# Patient Record
Sex: FEMALE | Race: WHITE
[De-identification: names, ages, dates, MRNs, and addresses within clinical notes are randomized per-mention and may not be internally consistent; named-entity substitution may affect disease eponyms.]

---

## 2019-01-22 ENCOUNTER — HOSPITAL ENCOUNTER (EMERGENCY)
Dept: HOSPITAL 47 - EC | Age: 39
LOS: 1 days | Discharge: HOME | End: 2019-01-23
Payer: COMMERCIAL

## 2019-01-22 DIAGNOSIS — Z98.51: ICD-10-CM

## 2019-01-22 DIAGNOSIS — N12: Primary | ICD-10-CM

## 2019-01-22 DIAGNOSIS — Z87.42: ICD-10-CM

## 2019-01-22 DIAGNOSIS — F17.200: ICD-10-CM

## 2019-01-22 DIAGNOSIS — Z90.49: ICD-10-CM

## 2019-01-22 LAB
ALBUMIN SERPL-MCNC: 4 G/DL (ref 3.5–5)
ALP SERPL-CCNC: 81 U/L (ref 38–126)
ALT SERPL-CCNC: 27 U/L (ref 9–52)
ANION GAP SERPL CALC-SCNC: 8 MMOL/L
AST SERPL-CCNC: 15 U/L (ref 14–36)
BASOPHILS # BLD AUTO: 0.1 K/UL (ref 0–0.2)
BASOPHILS NFR BLD AUTO: 0 %
BUN SERPL-SCNC: 11 MG/DL (ref 7–17)
CALCIUM SPEC-MCNC: 9.2 MG/DL (ref 8.4–10.2)
CHLORIDE SERPL-SCNC: 108 MMOL/L (ref 98–107)
CO2 SERPL-SCNC: 24 MMOL/L (ref 22–30)
EOSINOPHIL # BLD AUTO: 0.3 K/UL (ref 0–0.7)
EOSINOPHIL NFR BLD AUTO: 2 %
ERYTHROCYTE [DISTWIDTH] IN BLOOD BY AUTOMATED COUNT: 4.96 M/UL (ref 3.8–5.4)
ERYTHROCYTE [DISTWIDTH] IN BLOOD: 12.9 % (ref 11.5–15.5)
GLUCOSE SERPL-MCNC: 86 MG/DL (ref 74–99)
HCT VFR BLD AUTO: 44.9 % (ref 34–46)
HGB BLD-MCNC: 15.1 GM/DL (ref 11.4–16)
LYMPHOCYTES # SPEC AUTO: 3.8 K/UL (ref 1–4.8)
LYMPHOCYTES NFR SPEC AUTO: 30 %
MCH RBC QN AUTO: 30.4 PG (ref 25–35)
MCHC RBC AUTO-ENTMCNC: 33.5 G/DL (ref 31–37)
MCV RBC AUTO: 90.6 FL (ref 80–100)
MONOCYTES # BLD AUTO: 0.7 K/UL (ref 0–1)
MONOCYTES NFR BLD AUTO: 5 %
NEUTROPHILS # BLD AUTO: 7.8 K/UL (ref 1.3–7.7)
NEUTROPHILS NFR BLD AUTO: 61 %
PH UR: 5.5 [PH] (ref 5–8)
PLATELET # BLD AUTO: 252 K/UL (ref 150–450)
POTASSIUM SERPL-SCNC: 4 MMOL/L (ref 3.5–5.1)
PROT SERPL-MCNC: 6.8 G/DL (ref 6.3–8.2)
RBC UR QL: 1 /HPF (ref 0–5)
SODIUM SERPL-SCNC: 140 MMOL/L (ref 137–145)
SP GR UR: 1.02 (ref 1–1.03)
SQUAMOUS UR QL AUTO: 18 /HPF (ref 0–4)
UROBILINOGEN UR QL STRIP: 2 MG/DL (ref ?–2)
WBC # BLD AUTO: 12.8 K/UL (ref 3.8–10.6)

## 2019-01-22 PROCEDURE — 81025 URINE PREGNANCY TEST: CPT

## 2019-01-22 PROCEDURE — 36415 COLL VENOUS BLD VENIPUNCTURE: CPT

## 2019-01-22 PROCEDURE — 93975 VASCULAR STUDY: CPT

## 2019-01-22 PROCEDURE — 87086 URINE CULTURE/COLONY COUNT: CPT

## 2019-01-22 PROCEDURE — 76830 TRANSVAGINAL US NON-OB: CPT

## 2019-01-22 PROCEDURE — 99284 EMERGENCY DEPT VISIT MOD MDM: CPT

## 2019-01-22 PROCEDURE — 80053 COMPREHEN METABOLIC PANEL: CPT

## 2019-01-22 PROCEDURE — 85025 COMPLETE CBC W/AUTO DIFF WBC: CPT

## 2019-01-22 PROCEDURE — 96366 THER/PROPH/DIAG IV INF ADDON: CPT

## 2019-01-22 PROCEDURE — 96361 HYDRATE IV INFUSION ADD-ON: CPT

## 2019-01-22 PROCEDURE — 81001 URINALYSIS AUTO W/SCOPE: CPT

## 2019-01-22 PROCEDURE — 96365 THER/PROPH/DIAG IV INF INIT: CPT

## 2019-01-22 NOTE — ED
Medical Decision Making





- Medical Decision Making


abx e-scribed per pt request 








- Lab Data


Result diagrams: 


 01/22/19 21:30





 01/22/19 21:30





 Lab Results











  01/22/19 01/22/19 01/22/19 Range/Units





  21:22 21:22 21:30 


 


WBC    12.8 H  (3.8-10.6)  k/uL


 


RBC    4.96  (3.80-5.40)  m/uL


 


Hgb    15.1  (11.4-16.0)  gm/dL


 


Hct    44.9  (34.0-46.0)  %


 


MCV    90.6  (80.0-100.0)  fL


 


MCH    30.4  (25.0-35.0)  pg


 


MCHC    33.5  (31.0-37.0)  g/dL


 


RDW    12.9  (11.5-15.5)  %


 


Plt Count    252  (150-450)  k/uL


 


Neutrophils %    61  %


 


Lymphocytes %    30  %


 


Monocytes %    5  %


 


Eosinophils %    2  %


 


Basophils %    0  %


 


Neutrophils #    7.8 H  (1.3-7.7)  k/uL


 


Lymphocytes #    3.8  (1.0-4.8)  k/uL


 


Monocytes #    0.7  (0-1.0)  k/uL


 


Eosinophils #    0.3  (0-0.7)  k/uL


 


Basophils #    0.1  (0-0.2)  k/uL


 


Sodium     (137-145)  mmol/L


 


Potassium     (3.5-5.1)  mmol/L


 


Chloride     ()  mmol/L


 


Carbon Dioxide     (22-30)  mmol/L


 


Anion Gap     mmol/L


 


BUN     (7-17)  mg/dL


 


Creatinine     (0.52-1.04)  mg/dL


 


Est GFR (CKD-EPI)AfAm     (>60 ml/min/1.73 sqM)  


 


Est GFR (CKD-EPI)NonAf     (>60 ml/min/1.73 sqM)  


 


Glucose     (74-99)  mg/dL


 


Calcium     (8.4-10.2)  mg/dL


 


Total Bilirubin     (0.2-1.3)  mg/dL


 


AST     (14-36)  U/L


 


ALT     (9-52)  U/L


 


Alkaline Phosphatase     ()  U/L


 


Total Protein     (6.3-8.2)  g/dL


 


Albumin     (3.5-5.0)  g/dL


 


Urine Color   Yellow   


 


Urine Appearance   Cloudy H   (Clear)  


 


Urine pH   5.5   (5.0-8.0)  


 


Ur Specific Gravity   1.023   (1.001-1.035)  


 


Urine Protein   Trace H   (Negative)  


 


Urine Glucose (UA)   Negative   (Negative)  


 


Urine Ketones   Negative   (Negative)  


 


Urine Blood   Moderate H   (Negative)  


 


Urine Nitrite   Negative   (Negative)  


 


Urine Bilirubin   Negative   (Negative)  


 


Urine Urobilinogen   2.0   (<2.0)  mg/dL


 


Ur Leukocyte Esterase   Large H   (Negative)  


 


Urine RBC   1   (0-5)  /hpf


 


Urine WBC   26 H   (0-5)  /hpf


 


Ur Squamous Epith Cells   18 H   (0-4)  /hpf


 


Urine Mucus   Many H   (None)  /hpf


 


Urine HCG, Qual  Not Detected    (Not Detectd)  














  01/22/19 Range/Units





  21:30 


 


WBC   (3.8-10.6)  k/uL


 


RBC   (3.80-5.40)  m/uL


 


Hgb   (11.4-16.0)  gm/dL


 


Hct   (34.0-46.0)  %


 


MCV   (80.0-100.0)  fL


 


MCH   (25.0-35.0)  pg


 


MCHC   (31.0-37.0)  g/dL


 


RDW   (11.5-15.5)  %


 


Plt Count   (150-450)  k/uL


 


Neutrophils %   %


 


Lymphocytes %   %


 


Monocytes %   %


 


Eosinophils %   %


 


Basophils %   %


 


Neutrophils #   (1.3-7.7)  k/uL


 


Lymphocytes #   (1.0-4.8)  k/uL


 


Monocytes #   (0-1.0)  k/uL


 


Eosinophils #   (0-0.7)  k/uL


 


Basophils #   (0-0.2)  k/uL


 


Sodium  140  (137-145)  mmol/L


 


Potassium  4.0  (3.5-5.1)  mmol/L


 


Chloride  108 H  ()  mmol/L


 


Carbon Dioxide  24  (22-30)  mmol/L


 


Anion Gap  8  mmol/L


 


BUN  11  (7-17)  mg/dL


 


Creatinine  0.53  (0.52-1.04)  mg/dL


 


Est GFR (CKD-EPI)AfAm  >90  (>60 ml/min/1.73 sqM)  


 


Est GFR (CKD-EPI)NonAf  >90  (>60 ml/min/1.73 sqM)  


 


Glucose  86  (74-99)  mg/dL


 


Calcium  9.2  (8.4-10.2)  mg/dL


 


Total Bilirubin  0.4  (0.2-1.3)  mg/dL


 


AST  15  (14-36)  U/L


 


ALT  27  (9-52)  U/L


 


Alkaline Phosphatase  81  ()  U/L


 


Total Protein  6.8  (6.3-8.2)  g/dL


 


Albumin  4.0  (3.5-5.0)  g/dL


 


Urine Color   


 


Urine Appearance   (Clear)  


 


Urine pH   (5.0-8.0)  


 


Ur Specific Gravity   (1.001-1.035)  


 


Urine Protein   (Negative)  


 


Urine Glucose (UA)   (Negative)  


 


Urine Ketones   (Negative)  


 


Urine Blood   (Negative)  


 


Urine Nitrite   (Negative)  


 


Urine Bilirubin   (Negative)  


 


Urine Urobilinogen   (<2.0)  mg/dL


 


Ur Leukocyte Esterase   (Negative)  


 


Urine RBC   (0-5)  /hpf


 


Urine WBC   (0-5)  /hpf


 


Ur Squamous Epith Cells   (0-4)  /hpf


 


Urine Mucus   (None)  /hpf


 


Urine HCG, Qual   (Not Detectd)  














Disposition


Clinical Impression: 


 Pyelonephritis





Disposition: HOME SELF-CARE


Condition: Stable


Instructions (If sedation given, give patient instructions):  Urinary Tract 

Infection in Women (ED), Kidney Infection (ED)


Additional Instructions: 


Patient to adhere to previously discussed treatment plan and will take 

medication(s) as directed. Patient to follow up with PCP in 1-2 days. Patient 

to return to ED if symptoms do not improve. 


Prescriptions: 


Ciprofloxacin HCl [Cipro] 500 mg PO Q12HR 14 Days #28 day


Is patient prescribed a controlled substance at d/c from ED?: No


Referrals: 


Hernan Holliday MD [Primary Care Provider] - 1-2 days

## 2019-01-22 NOTE — US
EXAMINATION TYPE: US transvaginal

 

DATE OF EXAM: 1/22/2019

 

COMPARISON: NONE

 

CLINICAL HISTORY: Pain. Pain

 

 

TECHNIQUE:  Transvaginal (TV).  

 

EXAM MEASUREMENTS:

 

Uterus:  7.2 x 4.9 x 5.7  cm

Endometrial Stripe: 0.5 cm

Right Ovary:  2.8 x 1.7 x 2.0  cm

Left Ovary:  2.2 x 1.4 x 2.4 cm

 

 

 

1. Uterus:  Anteverted   Nabothian cyst

2. Endometrium:  wnl

3. Right Ovary:  wnl

4. Left Ovary:  wnl

**Spectral, color and waveform doppler imaging shows good arterial and venous flow within the ovaries
; there is no evidence for ovarian torsion.

5. Bilateral Adnexa:  wnl

6. Posterior cul-de-sac:  wnl

 

 

 

IMPRESSION: Normal uterus and endometrium. No adnexal mass. No evidence of ovarian torsion. Negative 
exam.

## 2019-01-22 NOTE — ED
General Adult HPI





- General


Chief complaint: Abdominal Pain


Stated complaint: Poss cyst on ovaries


Time Seen by Provider: 01/22/19 21:00


Source: patient, RN notes reviewed, old records reviewed


Mode of arrival: ambulatory


Limitations: no limitations





- History of Present Illness


Initial comments: 


38-year-old female no pertinent past history patient presents to ED with cheif 

complaint of right adnexal pain that has been ongoing for 2 days.  Patient 

states that this pain feels somewhat similar to pain she had a right ovarian 

cysts in the past. Describes the pain as a burning sensation. Patient has a 

tubal ligation and is s/p cholecystectomy.  Patient states that she is 

completing her menses now, however, has not noticed any abnormal discharge or 

bleeding.  Patient denies any other complaints.  Patient denies fever chills.  

Patient denies chest pain, shortness of breath, abdominal pain.








Systemic: Pt denies fatigue, myalgia, fever/chills, rash. Pt denies weakness, 

night sweats, weight loss. 


Neuro: Pt denies headache, visual disturbances, syncope or pre-syncope.


HEENT: Pt denies ocular discharge or irritation, otalgia, rhinorrhea, 

pharyngitis or notable lymphadenopathy. 


Cardiopulmonary: Pt denies chest pain, SOB, heart palpitations, dyspnea on 

exertion.  


Abdominal/GI: Pt denies abdominal pain, n/v/d. 


: Pt denies dysuria, burning w/ urination, frequency/urgency. Denies new 

onset urinary or bowel incontinence.  


MSK: Pt denies myalgia, loss of strength or function in extremities. 


Neuro: Pt denies new onset weakness, paresthesias. 








- Related Data


 Home Medications











 Medication  Instructions  Recorded  Confirmed


 


Ibuprofen [Motrin Ib] 400 mg PO Q6HR PRN 01/22/19 01/22/19








 Previous Rx's











 Medication  Instructions  Recorded


 


Ciprofloxacin HCl [Cipro] 500 mg PO Q12HR 14 Days #28 day 01/22/19











 Allergies











Allergy/AdvReac Type Severity Reaction Status Date / Time


 


No Known Allergies Allergy   Verified 01/22/19 21:05














Review of Systems


ROS Statement: 


Those systems with pertinent positive or pertinent negative responses have been 

documented in the HPI.





ROS Other: All systems not noted in ROS Statement are negative.





Past Medical History


Past Medical History: No Reported History


History of Any Multi-Drug Resistant Organisms: None Reported


Past Surgical History: Cholecystectomy, Tonsillectomy, Tubal Ligation


Past Psychological History: Depression


Smoking Status: Current every day smoker


Past Alcohol Use History: None Reported


Past Drug Use History: None Reported





General Exam





- General Exam Comments


Initial Comments: 


Constitutional: NAD, AOX3, Pt has pleasant affect. 


HEENT: NC/AT, trachea midline, neck supple, no lymphadenopathy. Posterior 

pharynx non erythematous, without exudates. External ears appear normal, 

without discharge. Mucous membranes moist. Eyes PERRLA, EOM intact. There is no 

scleral icterus. No pallor noted. 


Cardiopulmonary: RRR, no murmurs, rubs or gallops, no JVD noted. Lungs CTAB in 

anterior and posterior fields. No peripheral edema. 


Abdominal exam: Abdomen soft and non-distended. Abdomen non-tender to palpation 

in all 4 quadrants. No tenderness at mcburneys point. Bowel sounds active in 

LLQ. No hepatosplenomegaly. No ecchymosis.  Right adnexa mildly tender to 

palpation, left adnexa nontender to palpation.


Neuro: CN II-XII grossly intact. No nuchal rigidity. 


MSK: No posterior calf tenderness bilaterally, homans sign negative 

bilaterally. Posterior tibialis and radial pulse +2 bilaterally. Sensation 

intact in upper and lower extremities. Full active ROM in upper and lower 

extremities, 5/5 stregnth. 





Limitations: no limitations





Course


 Vital Signs











  01/22/19 01/22/19





  20:53 22:41


 


Temperature 98.3 F 


 


Pulse Rate 72 62


 


Respiratory 18 15





Rate  


 


Blood Pressure 113/80 116/82


 


O2 Sat by Pulse 100 99





Oximetry  














Medical Decision Making





- Medical Decision Making


38-year-old female no pertinent past history patient presents to ED with cheif 

complaint of right adnexal pain that has been ongoing for 2 days.  Patient 

states that this pain feels somewhat similar to pain she had a right ovarian 

cysts in the past. Describes the pain as a burning sensation. Patient has a 

tubal ligation and is s/p cholecystectomy.  Patient states that she is 

completing her menses now, however, has not noticed any abnormal discharge or 

bleeding.  Patient denies any other complaints.  VSS, afebrile.  Physical exam 

displayed: Abdomen soft and non-distended. Abdomen non-tender to palpation in 

all 4 quadrants. No tenderness at mcburneys point. Bowel sounds active in LLQ. 

No hepatosplenomegaly. No ecchymosis.  Right adnexa mildly tender to palpation, 

left adnexa nontender to palpation.  Laboratory investigations revealed mildly 

increased white blood cell count of 12.8.  CBC otherwise noncompressive.  CMP 

within normal limits.  UA displayed 26 white blood cells, large leukocyte 

esterase, 18 epithelial cells. HCG negative. Transvaginal ultrasound did not 

display any pathology, normal uterus and endometrium.  No adnexal mass.  No 

evidence of ovarian torsion.  Negative exam.  Patient to be treated for 

pyelonephritis.  Patient administered 2 g of Rocephin in ED.  Patient to be 

discharged with 2 weeks of ciprofloxacin.  Patient to follow up with primary 

care provider in 1-2 days.  Patient to return to ED if descends symptoms 

develop or if condition worsens in any way. Case discussed in depth with Dr. Patino. 








- Lab Data


Result diagrams: 


 01/22/19 21:30





 01/22/19 21:30


 Lab Results











  01/22/19 01/22/19 01/22/19 Range/Units





  21:22 21:22 21:30 


 


WBC    12.8 H  (3.8-10.6)  k/uL


 


RBC    4.96  (3.80-5.40)  m/uL


 


Hgb    15.1  (11.4-16.0)  gm/dL


 


Hct    44.9  (34.0-46.0)  %


 


MCV    90.6  (80.0-100.0)  fL


 


MCH    30.4  (25.0-35.0)  pg


 


MCHC    33.5  (31.0-37.0)  g/dL


 


RDW    12.9  (11.5-15.5)  %


 


Plt Count    252  (150-450)  k/uL


 


Neutrophils %    61  %


 


Lymphocytes %    30  %


 


Monocytes %    5  %


 


Eosinophils %    2  %


 


Basophils %    0  %


 


Neutrophils #    7.8 H  (1.3-7.7)  k/uL


 


Lymphocytes #    3.8  (1.0-4.8)  k/uL


 


Monocytes #    0.7  (0-1.0)  k/uL


 


Eosinophils #    0.3  (0-0.7)  k/uL


 


Basophils #    0.1  (0-0.2)  k/uL


 


Sodium     (137-145)  mmol/L


 


Potassium     (3.5-5.1)  mmol/L


 


Chloride     ()  mmol/L


 


Carbon Dioxide     (22-30)  mmol/L


 


Anion Gap     mmol/L


 


BUN     (7-17)  mg/dL


 


Creatinine     (0.52-1.04)  mg/dL


 


Est GFR (CKD-EPI)AfAm     (>60 ml/min/1.73 sqM)  


 


Est GFR (CKD-EPI)NonAf     (>60 ml/min/1.73 sqM)  


 


Glucose     (74-99)  mg/dL


 


Calcium     (8.4-10.2)  mg/dL


 


Total Bilirubin     (0.2-1.3)  mg/dL


 


AST     (14-36)  U/L


 


ALT     (9-52)  U/L


 


Alkaline Phosphatase     ()  U/L


 


Total Protein     (6.3-8.2)  g/dL


 


Albumin     (3.5-5.0)  g/dL


 


Urine Color   Yellow   


 


Urine Appearance   Cloudy H   (Clear)  


 


Urine pH   5.5   (5.0-8.0)  


 


Ur Specific Gravity   1.023   (1.001-1.035)  


 


Urine Protein   Trace H   (Negative)  


 


Urine Glucose (UA)   Negative   (Negative)  


 


Urine Ketones   Negative   (Negative)  


 


Urine Blood   Moderate H   (Negative)  


 


Urine Nitrite   Negative   (Negative)  


 


Urine Bilirubin   Negative   (Negative)  


 


Urine Urobilinogen   2.0   (<2.0)  mg/dL


 


Ur Leukocyte Esterase   Large H   (Negative)  


 


Urine RBC   1   (0-5)  /hpf


 


Urine WBC   26 H   (0-5)  /hpf


 


Ur Squamous Epith Cells   18 H   (0-4)  /hpf


 


Urine Mucus   Many H   (None)  /hpf


 


Urine HCG, Qual  Not Detected    (Not Detectd)  














  01/22/19 Range/Units





  21:30 


 


WBC   (3.8-10.6)  k/uL


 


RBC   (3.80-5.40)  m/uL


 


Hgb   (11.4-16.0)  gm/dL


 


Hct   (34.0-46.0)  %


 


MCV   (80.0-100.0)  fL


 


MCH   (25.0-35.0)  pg


 


MCHC   (31.0-37.0)  g/dL


 


RDW   (11.5-15.5)  %


 


Plt Count   (150-450)  k/uL


 


Neutrophils %   %


 


Lymphocytes %   %


 


Monocytes %   %


 


Eosinophils %   %


 


Basophils %   %


 


Neutrophils #   (1.3-7.7)  k/uL


 


Lymphocytes #   (1.0-4.8)  k/uL


 


Monocytes #   (0-1.0)  k/uL


 


Eosinophils #   (0-0.7)  k/uL


 


Basophils #   (0-0.2)  k/uL


 


Sodium  140  (137-145)  mmol/L


 


Potassium  4.0  (3.5-5.1)  mmol/L


 


Chloride  108 H  ()  mmol/L


 


Carbon Dioxide  24  (22-30)  mmol/L


 


Anion Gap  8  mmol/L


 


BUN  11  (7-17)  mg/dL


 


Creatinine  0.53  (0.52-1.04)  mg/dL


 


Est GFR (CKD-EPI)AfAm  >90  (>60 ml/min/1.73 sqM)  


 


Est GFR (CKD-EPI)NonAf  >90  (>60 ml/min/1.73 sqM)  


 


Glucose  86  (74-99)  mg/dL


 


Calcium  9.2  (8.4-10.2)  mg/dL


 


Total Bilirubin  0.4  (0.2-1.3)  mg/dL


 


AST  15  (14-36)  U/L


 


ALT  27  (9-52)  U/L


 


Alkaline Phosphatase  81  ()  U/L


 


Total Protein  6.8  (6.3-8.2)  g/dL


 


Albumin  4.0  (3.5-5.0)  g/dL


 


Urine Color   


 


Urine Appearance   (Clear)  


 


Urine pH   (5.0-8.0)  


 


Ur Specific Gravity   (1.001-1.035)  


 


Urine Protein   (Negative)  


 


Urine Glucose (UA)   (Negative)  


 


Urine Ketones   (Negative)  


 


Urine Blood   (Negative)  


 


Urine Nitrite   (Negative)  


 


Urine Bilirubin   (Negative)  


 


Urine Urobilinogen   (<2.0)  mg/dL


 


Ur Leukocyte Esterase   (Negative)  


 


Urine RBC   (0-5)  /hpf


 


Urine WBC   (0-5)  /hpf


 


Ur Squamous Epith Cells   (0-4)  /hpf


 


Urine Mucus   (None)  /hpf


 


Urine HCG, Qual   (Not Detectd)  














Disposition


Clinical Impression: 


 Pyelonephritis





Disposition: HOME SELF-CARE


Condition: Stable


Instructions (If sedation given, give patient instructions):  Urinary Tract 

Infection in Women (ED), Kidney Infection (ED)


Additional Instructions: 


Patient to adhere to previously discussed treatment plan and will take 

medication(s) as directed. Patient to follow up with PCP in 1-2 days. Patient 

to return to ED if symptoms do not improve. 


Prescriptions: 


Ciprofloxacin HCl [Cipro] 500 mg PO Q12HR 14 Days #28 day


Is patient prescribed a controlled substance at d/c from ED?: No


Referrals: 


Hernan Holliday MD [Primary Care Provider] - 1-2 days


Time of Disposition: 23:52

## 2019-01-23 VITALS
SYSTOLIC BLOOD PRESSURE: 107 MMHG | RESPIRATION RATE: 17 BRPM | DIASTOLIC BLOOD PRESSURE: 72 MMHG | TEMPERATURE: 97.4 F | HEART RATE: 58 BPM

## 2019-10-21 ENCOUNTER — HOSPITAL ENCOUNTER (OUTPATIENT)
Dept: HOSPITAL 47 - RADUSWWP | Age: 39
End: 2019-10-21
Attending: OBSTETRICS & GYNECOLOGY
Payer: COMMERCIAL

## 2019-10-21 DIAGNOSIS — N83.201: Primary | ICD-10-CM

## 2019-10-21 PROCEDURE — 76856 US EXAM PELVIC COMPLETE: CPT

## 2019-10-21 NOTE — US
EXAMINATION TYPE: US pelvic complete

 

DATE OF EXAM: 10/21/2019

 

COMPARISON: US 2019

 

CLINICAL HISTORY: 39-year-old female R19.09 PELVIC MASS. Follow up right ovarian mass seen on previou
s ultrasound done last month at [facility], irregular cycles,  6, para 4, misca
rriage 2, history of  and tubal ligation. 

 

TECHNIQUE:  Transabdominal sonographic images of the pelvis were acquired.  

 

Date of LMP:  2 weeks ago

 

FINDINGS:

 

EXAM MEASUREMENTS:

 

Uterus:  9.4 x 3.7 x 5.6 cm

Endometrial Stripe: 0.6 cm

Right Ovary:  3.7 x 2.7 x 2.4 cm

Left Ovary:  2.8 x 1.5 x 2.3 cm

 

 

 

1. Uterus:  anteverted, wnl

2. Endometrium:  wnl

3. Right Ovary: A 1.5 cm dominant follicle or functional cyst is present within.

4. Left Ovary:  wnl

5. Bilateral Adnexa:  wnl

6. Posterior cul-de-sac:  wnl

 

 

 

IMPRESSION: 

1. A 1.5 cm dominant follicle or functional cyst within the right ovary. No evident adnexal abnormali
ty.

2. Note that the patient's recent outside ultrasound is not available for review.

## 2021-04-29 ENCOUNTER — HOSPITAL ENCOUNTER (OUTPATIENT)
Dept: HOSPITAL 47 - LABWHC1 | Age: 41
Discharge: HOME | End: 2021-04-29
Attending: OBSTETRICS & GYNECOLOGY
Payer: COMMERCIAL

## 2021-04-29 DIAGNOSIS — N93.8: Primary | ICD-10-CM

## 2021-04-29 PROCEDURE — 84443 ASSAY THYROID STIM HORMONE: CPT

## 2021-04-29 PROCEDURE — 36415 COLL VENOUS BLD VENIPUNCTURE: CPT

## 2021-04-29 PROCEDURE — 84439 ASSAY OF FREE THYROXINE: CPT

## 2021-04-29 PROCEDURE — 84479 ASSAY OF THYROID (T3 OR T4): CPT

## 2021-04-30 LAB — T4 FREE SERPL-MCNC: 1.4 NG/DL (ref 0.8–1.8)

## 2021-07-05 ENCOUNTER — HOSPITAL ENCOUNTER (OUTPATIENT)
Dept: HOSPITAL 47 - RADMAMWWP | Age: 41
Discharge: HOME | End: 2021-07-05
Attending: OBSTETRICS & GYNECOLOGY
Payer: COMMERCIAL

## 2021-07-05 DIAGNOSIS — Z12.31: Primary | ICD-10-CM

## 2021-07-05 DIAGNOSIS — Z80.3: ICD-10-CM

## 2021-07-05 PROCEDURE — 77067 SCR MAMMO BI INCL CAD: CPT

## 2021-07-08 NOTE — MM
Reason for exam: screening  (asymptomatic).

Last mammogram was performed 5 years and 4 months ago.



History:

Family history of breast cancer in mother and breast cancer in sister.



Physical Findings:

A clinical breast exam by your physician is recommended on an annual basis and 

results should be correlated with mammographic findings.



MG Screening Mammo w CAD

Bilateral CC and MLO view(s) were taken.

Prior study comparison: February 25, 2016, mammogram, performed at [facility].

The breast tissue is heterogeneously dense. This may lower the sensitivity of 

mammography.  Stable benign calcifications. There is no discrete abnormality.  No 

significant changes when compared with prior studies.





ASSESSMENT: Benign, BI-RAD 2



RECOMMENDATION:

Routine screening mammogram of both breasts in 1 year.

## 2021-10-11 ENCOUNTER — HOSPITAL ENCOUNTER (OUTPATIENT)
Dept: HOSPITAL 47 - LABWHC1 | Age: 41
Discharge: HOME | End: 2021-10-11
Attending: NURSE PRACTITIONER
Payer: COMMERCIAL

## 2021-10-11 DIAGNOSIS — Z00.00: Primary | ICD-10-CM

## 2021-10-11 PROCEDURE — 82947 ASSAY GLUCOSE BLOOD QUANT: CPT

## 2021-10-11 PROCEDURE — 36415 COLL VENOUS BLD VENIPUNCTURE: CPT

## 2021-10-11 PROCEDURE — 80061 LIPID PANEL: CPT

## 2021-10-12 LAB
CHOLEST SERPL-MCNC: 164 MG/DL (ref 0–200)
GLUCOSE SERPL-MCNC: 95 MG/DL (ref 70–110)
HDLC SERPL-MCNC: 27.5 MG/DL (ref 40–60)
LDLC SERPL CALC-MCNC: 115.3 MG/DL (ref 0–131)
TRIGL SERPL-MCNC: 106 MG/DL (ref 0–149)
VLDLC SERPL CALC-MCNC: 21.2 MG/DL (ref 5–40)

## 2021-12-15 ENCOUNTER — HOSPITAL ENCOUNTER (EMERGENCY)
Dept: HOSPITAL 47 - EC | Age: 41
Discharge: HOME | End: 2021-12-15
Payer: COMMERCIAL

## 2021-12-15 VITALS
TEMPERATURE: 98.2 F | SYSTOLIC BLOOD PRESSURE: 144 MMHG | RESPIRATION RATE: 20 BRPM | DIASTOLIC BLOOD PRESSURE: 83 MMHG | HEART RATE: 94 BPM

## 2021-12-15 DIAGNOSIS — R06.02: ICD-10-CM

## 2021-12-15 DIAGNOSIS — T50.B95A: ICD-10-CM

## 2021-12-15 DIAGNOSIS — F17.200: ICD-10-CM

## 2021-12-15 DIAGNOSIS — Z79.1: ICD-10-CM

## 2021-12-15 DIAGNOSIS — R07.89: Primary | ICD-10-CM

## 2021-12-15 PROCEDURE — 93005 ELECTROCARDIOGRAM TRACING: CPT

## 2021-12-15 PROCEDURE — 99285 EMERGENCY DEPT VISIT HI MDM: CPT

## 2021-12-15 PROCEDURE — 71046 X-RAY EXAM CHEST 2 VIEWS: CPT

## 2021-12-15 NOTE — XR
EXAMINATION TYPE: XR chest 2V

 

DATE OF EXAM: 12/15/2021

 

COMPARISON: NONE

 

TECHNIQUE: PA and lateral views submitted.

 

HISTORY: Difficulty breathing

 

FINDINGS:

The lungs are clear and  there is no pneumothorax, pleural effusion, or focal pneumonia.  Heart size 
normal. No overt failure. Surgical clips in the right upper quadrant.

 

IMPRESSION: 

1. No acute process.

## 2022-01-25 ENCOUNTER — HOSPITAL ENCOUNTER (OUTPATIENT)
Dept: HOSPITAL 47 - RADXRMAIN | Age: 42
Discharge: HOME | End: 2022-01-25
Attending: FAMILY MEDICINE
Payer: COMMERCIAL

## 2022-01-25 DIAGNOSIS — R07.89: Primary | ICD-10-CM

## 2022-01-25 PROCEDURE — 71046 X-RAY EXAM CHEST 2 VIEWS: CPT

## 2022-01-25 NOTE — XR
EXAMINATION TYPE: XR chest 2V

 

DATE OF EXAM: 1/25/2022

 

COMPARISON: 12/15/2021

 

HISTORY: 41-year-old female R07.89, chest pain

 

TECHNIQUE:  Frontal and lateral views

 

FINDINGS:  

The heart is normal size. Aorta and pulmonary vasculature within normal limits. Mild interstitial pro
minence is unchanged. No consolidation or pleural effusion.

 

 

IMPRESSION:  

Chronic changes, possible bronchitis or chronic asthma. Otherwise, no acute process seen.

## 2022-04-13 ENCOUNTER — HOSPITAL ENCOUNTER (OUTPATIENT)
Dept: HOSPITAL 47 - LABWHC1 | Age: 42
Discharge: HOME | End: 2022-04-13
Attending: FAMILY MEDICINE
Payer: COMMERCIAL

## 2022-04-13 DIAGNOSIS — R09.81: ICD-10-CM

## 2022-04-13 DIAGNOSIS — Z20.822: Primary | ICD-10-CM

## 2022-04-13 PROCEDURE — 87502 INFLUENZA DNA AMP PROBE: CPT

## 2022-07-12 ENCOUNTER — HOSPITAL ENCOUNTER (EMERGENCY)
Dept: HOSPITAL 47 - EC | Age: 42
Discharge: HOME | End: 2022-07-12
Payer: COMMERCIAL

## 2022-07-12 VITALS — RESPIRATION RATE: 20 BRPM | SYSTOLIC BLOOD PRESSURE: 120 MMHG | HEART RATE: 56 BPM | DIASTOLIC BLOOD PRESSURE: 75 MMHG

## 2022-07-12 VITALS — TEMPERATURE: 98 F

## 2022-07-12 DIAGNOSIS — F17.200: ICD-10-CM

## 2022-07-12 DIAGNOSIS — U07.1: Primary | ICD-10-CM

## 2022-07-12 LAB
ALBUMIN SERPL-MCNC: 3.7 G/DL (ref 3.5–5)
ALP SERPL-CCNC: 90 U/L (ref 38–126)
ALT SERPL-CCNC: 15 U/L (ref 4–34)
ANION GAP SERPL CALC-SCNC: 8 MMOL/L
APTT BLD: 24.3 SEC (ref 22–30)
AST SERPL-CCNC: 33 U/L (ref 14–36)
BASOPHILS # BLD AUTO: 0.1 K/UL (ref 0–0.2)
BASOPHILS NFR BLD AUTO: 0 %
BUN SERPL-SCNC: 15 MG/DL (ref 7–17)
CALCIUM SPEC-MCNC: 8.8 MG/DL (ref 8.4–10.2)
CHLORIDE SERPL-SCNC: 105 MMOL/L (ref 98–107)
CO2 SERPL-SCNC: 21 MMOL/L (ref 22–30)
EOSINOPHIL # BLD AUTO: 0.3 K/UL (ref 0–0.7)
EOSINOPHIL NFR BLD AUTO: 2 %
ERYTHROCYTE [DISTWIDTH] IN BLOOD BY AUTOMATED COUNT: 5.19 M/UL (ref 3.8–5.4)
ERYTHROCYTE [DISTWIDTH] IN BLOOD: 13.3 % (ref 11.5–15.5)
GLUCOSE SERPL-MCNC: 87 MG/DL (ref 74–99)
HCT VFR BLD AUTO: 48.4 % (ref 34–46)
HGB BLD-MCNC: 16.4 GM/DL (ref 11.4–16)
INR PPP: 0.9 (ref ?–1.2)
LYMPHOCYTES # SPEC AUTO: 6.3 K/UL (ref 1–4.8)
LYMPHOCYTES NFR SPEC AUTO: 32 %
MCH RBC QN AUTO: 31.7 PG (ref 25–35)
MCHC RBC AUTO-ENTMCNC: 33.9 G/DL (ref 31–37)
MCV RBC AUTO: 93.4 FL (ref 80–100)
MONOCYTES # BLD AUTO: 1 K/UL (ref 0–1)
MONOCYTES NFR BLD AUTO: 5 %
NEUTROPHILS # BLD AUTO: 11.5 K/UL (ref 1.3–7.7)
NEUTROPHILS NFR BLD AUTO: 59 %
PLATELET # BLD AUTO: 308 K/UL (ref 150–450)
POTASSIUM SERPL-SCNC: 3.6 MMOL/L (ref 3.5–5.1)
PROT SERPL-MCNC: 6.3 G/DL (ref 6.3–8.2)
PT BLD: 10.3 SEC (ref 9–12)
SODIUM SERPL-SCNC: 134 MMOL/L (ref 137–145)
WBC # BLD AUTO: 19.4 K/UL (ref 3.8–10.6)

## 2022-07-12 PROCEDURE — 85025 COMPLETE CBC W/AUTO DIFF WBC: CPT

## 2022-07-12 PROCEDURE — 85730 THROMBOPLASTIN TIME PARTIAL: CPT

## 2022-07-12 PROCEDURE — 71046 X-RAY EXAM CHEST 2 VIEWS: CPT

## 2022-07-12 PROCEDURE — 83735 ASSAY OF MAGNESIUM: CPT

## 2022-07-12 PROCEDURE — 84484 ASSAY OF TROPONIN QUANT: CPT

## 2022-07-12 PROCEDURE — 36415 COLL VENOUS BLD VENIPUNCTURE: CPT

## 2022-07-12 PROCEDURE — 93005 ELECTROCARDIOGRAM TRACING: CPT

## 2022-07-12 PROCEDURE — 94640 AIRWAY INHALATION TREATMENT: CPT

## 2022-07-12 PROCEDURE — 80053 COMPREHEN METABOLIC PANEL: CPT

## 2022-07-12 PROCEDURE — 85610 PROTHROMBIN TIME: CPT

## 2022-07-12 PROCEDURE — 85379 FIBRIN DEGRADATION QUANT: CPT

## 2022-07-12 NOTE — ED
General Adult HPI





- General


Chief complaint: Upper Respiratory Infection


Stated complaint: COVID+/SOB


Time Seen by Provider: 07/12/22 12:09


Source: patient


Mode of arrival: ambulatory


Limitations: no limitations





- History of Present Illness


Initial comments: 


Patient is a 42-year-old female presenting with chief complaint of shortness of 

breath.  Patient tested positive for Covid on 6/26.  She states that since then 

she has been experiencing shortness of breath at rest, which worsens with 

exertion.  She is also experiencing chest pain and a cough productive of clear 

sputum.  She was seen at [facility] on 7/1 and given steroids and antibiotics.  

Patient states that she finished her course of antibiotics yesterday.Patient 

states that she has been eating and drinking, denies any nausea, vomiting, 

abdominal pain.  Denies hemoptysis, fever, chills, dysuria, hematuria, 

palpitations, numbness, tingling, localized weakness, headache, vision or 

hearing changes.








- Related Data


                                Home Medications











 Medication  Instructions  Recorded  Confirmed


 


No Known Home Medications  12/15/21 12/15/21











                                    Allergies











Allergy/AdvReac Type Severity Reaction Status Date / Time


 


No Known Allergies Allergy   Verified 07/12/22 10:59














Review of Systems


ROS Statement: 


Those systems with pertinent positive or pertinent negative responses have been 

documented in the HPI.





ROS Other: All systems not noted in ROS Statement are negative.





Past Medical History


Past Medical History: No Reported History


History of Any Multi-Drug Resistant Organisms: None Reported


Past Surgical History: Cholecystectomy, Tonsillectomy, Tubal Ligation


Past Psychological History: Depression


Smoking Status: Current every day smoker


Past Alcohol Use History: None Reported


Past Drug Use History: None Reported





General Exam


Limitations: no limitations


General appearance: alert, in no apparent distress


Head exam: Present: atraumatic, normocephalic, normal inspection


Eye exam: Present: normal appearance, EOMI.  Absent: scleral icterus, 

periorbital swelling


Neck exam: Present: normal inspection


Respiratory exam: Present: normal lung sounds bilaterally, chest wall 

tenderness.  Absent: respiratory distress, wheezes, rales, rhonchi, stridor


Cardiovascular Exam: Present: regular rate, normal rhythm, normal heart sounds. 

Absent: systolic murmur, diastolic murmur, rubs, gallop, clicks


Back exam: Present: normal inspection


Neurological exam: Present: alert, oriented X3, CN II-XII intact


Psychiatric exam: Present: normal affect, normal mood


Skin exam: Present: warm, dry, intact, normal color.  Absent: rash





Course


                                   Vital Signs











  07/12/22 07/12/22 07/12/22





  10:55 12:20 13:20


 


Temperature 98 F  


 


Pulse Rate 58 L  84


 


Respiratory 18 24 18





Rate   


 


Blood Pressure 100/68  


 


O2 Sat by Pulse 98  





Oximetry   














  07/12/22





  13:27


 


Temperature 


 


Pulse Rate 88


 


Respiratory 18





Rate 


 


Blood Pressure 


 


O2 Sat by Pulse 





Oximetry 














EKG Findings





- EKG Comments:


EKG Findings:: Sinus bradycardia rate of 55.  IN interval 152.  QRS duration 90.

 QTc 412.  Normal axis.  No acute ST or T-wave changes.





Medical Decision Making





- Medical Decision Making


Patient is a 42-year-old female presenting with chief complaint of shortness of 

breath.  Patient tested positive for Covid on 6/26.  She also admits to chest 

pain and fatigue.  On examination there is chest wall tenderness with palpation.

 Heart and lungs are clear to auscultation.  Patient is bradycardic.  EKG shows 

rate of 55 and no ischemic changes.  Chest x-ray is negative for any acute 

cardiopulmonary process.  D-dimer is WNL.  Troponin is WNL.  Sodium is 134. 

Patient received DuoNeb and 2 L IV fluid bolus.  Patient appears stable for 

discharge with outpatient follow-up at this time.  Follow-up with PCP and 

pulmonologist in one to 2 days.  Report back to ER if any new or worsening 

symptoms.  I discussed return parameters and answered all questions.  Patient 

conveyed verbal understanding and agreed to the plan.  I discussed this case 

with my attending Dr. Loya.








- Lab Data


Result diagrams: 


                                 07/12/22 11:06





                                 07/12/22 11:06


                                   Lab Results











  07/12/22 07/12/22 07/12/22 Range/Units





  11:06 11:06 11:06 


 


WBC  19.4 H    (3.8-10.6)  k/uL


 


RBC  5.19    (3.80-5.40)  m/uL


 


Hgb  16.4 H    (11.4-16.0)  gm/dL


 


Hct  48.4 H    (34.0-46.0)  %


 


MCV  93.4    (80.0-100.0)  fL


 


MCH  31.7    (25.0-35.0)  pg


 


MCHC  33.9    (31.0-37.0)  g/dL


 


RDW  13.3    (11.5-15.5)  %


 


Plt Count  308    (150-450)  k/uL


 


MPV  7.9    


 


Neutrophils %  59    %


 


Lymphocytes %  32    %


 


Monocytes %  5    %


 


Eosinophils %  2    %


 


Basophils %  0    %


 


Neutrophils #  11.5 H    (1.3-7.7)  k/uL


 


Lymphocytes #  6.3 H    (1.0-4.8)  k/uL


 


Monocytes #  1.0    (0-1.0)  k/uL


 


Eosinophils #  0.3    (0-0.7)  k/uL


 


Basophils #  0.1    (0-0.2)  k/uL


 


Manual Slide Review  Performed    


 


RBC Morphology  Normal    


 


PT   10.3   (9.0-12.0)  sec


 


INR   0.9   (<1.2)  


 


APTT   24.3   (22.0-30.0)  sec


 


D-Dimer   0.57   (<0.60)  mg/L FEU


 


Sodium    134 L  (137-145)  mmol/L


 


Potassium    3.6  (3.5-5.1)  mmol/L


 


Chloride    105  ()  mmol/L


 


Carbon Dioxide    21 L  (22-30)  mmol/L


 


Anion Gap    8  mmol/L


 


BUN    15  (7-17)  mg/dL


 


Creatinine    0.56  (0.52-1.04)  mg/dL


 


Est GFR (CKD-EPI)AfAm    >90  (>60 ml/min/1.73 sqM)  


 


Est GFR (CKD-EPI)NonAf    >90  (>60 ml/min/1.73 sqM)  


 


Glucose    87  (74-99)  mg/dL


 


Calcium    8.8  (8.4-10.2)  mg/dL


 


Magnesium     (1.6-2.3)  mg/dL


 


Total Bilirubin    0.7  (0.2-1.3)  mg/dL


 


AST    33  (14-36)  U/L


 


ALT    15  (4-34)  U/L


 


Alkaline Phosphatase    90  ()  U/L


 


Troponin I     (0.000-0.034)  ng/mL


 


Total Protein    6.3  (6.3-8.2)  g/dL


 


Albumin    3.7  (3.5-5.0)  g/dL














  07/12/22 07/12/22 Range/Units





  11:06 11:06 


 


WBC    (3.8-10.6)  k/uL


 


RBC    (3.80-5.40)  m/uL


 


Hgb    (11.4-16.0)  gm/dL


 


Hct    (34.0-46.0)  %


 


MCV    (80.0-100.0)  fL


 


MCH    (25.0-35.0)  pg


 


MCHC    (31.0-37.0)  g/dL


 


RDW    (11.5-15.5)  %


 


Plt Count    (150-450)  k/uL


 


MPV    


 


Neutrophils %    %


 


Lymphocytes %    %


 


Monocytes %    %


 


Eosinophils %    %


 


Basophils %    %


 


Neutrophils #    (1.3-7.7)  k/uL


 


Lymphocytes #    (1.0-4.8)  k/uL


 


Monocytes #    (0-1.0)  k/uL


 


Eosinophils #    (0-0.7)  k/uL


 


Basophils #    (0-0.2)  k/uL


 


Manual Slide Review    


 


RBC Morphology    


 


PT    (9.0-12.0)  sec


 


INR    (<1.2)  


 


APTT    (22.0-30.0)  sec


 


D-Dimer    (<0.60)  mg/L FEU


 


Sodium    (137-145)  mmol/L


 


Potassium    (3.5-5.1)  mmol/L


 


Chloride    ()  mmol/L


 


Carbon Dioxide    (22-30)  mmol/L


 


Anion Gap    mmol/L


 


BUN    (7-17)  mg/dL


 


Creatinine    (0.52-1.04)  mg/dL


 


Est GFR (CKD-EPI)AfAm    (>60 ml/min/1.73 sqM)  


 


Est GFR (CKD-EPI)NonAf    (>60 ml/min/1.73 sqM)  


 


Glucose    (74-99)  mg/dL


 


Calcium    (8.4-10.2)  mg/dL


 


Magnesium   1.9  (1.6-2.3)  mg/dL


 


Total Bilirubin    (0.2-1.3)  mg/dL


 


AST    (14-36)  U/L


 


ALT    (4-34)  U/L


 


Alkaline Phosphatase    ()  U/L


 


Troponin I  <0.012   (0.000-0.034)  ng/mL


 


Total Protein    (6.3-8.2)  g/dL


 


Albumin    (3.5-5.0)  g/dL














Disposition


Clinical Impression: 


 COVID, Shortness of breath





Disposition: HOME SELF-CARE


Condition: Fair


Instructions (If sedation given, give patient instructions):  Teena antoine (ED), COVID-19 (Coronavirus Disease 2019) (ED)


Additional Instructions: 


Follow-up with PCP and pulmonology in one to 2 days.  Report back to ER with any

new or worsening symptoms.


Is patient prescribed a controlled substance at d/c from ED?: No


Referrals: 


Armen Garrido DO [Primary Care Provider] - 1-2 days


Justin Desouza DO [Doctor of Osteopathic Medicine] - 1-2 days


Time of Disposition: 13:02

## 2022-07-12 NOTE — XR
EXAMINATION TYPE: XR chest 2V

 

DATE OF EXAM: 7/12/2022

 

COMPARISON: 1/25/2022

 

HISTORY: Chest pain

 

TECHNIQUE:  Frontal and lateral views of the chest are obtained.

 

FINDINGS:  

 

There is no focal air space opacity.

 

No evidence for pneumothorax.  No pleural effusion.

 

The cardiac silhouette size is within normal limits.

 

The osseous structures are grossly intact.

 

IMPRESSION:  

 

1.  No acute cardiopulmonary process.

## 2022-09-12 ENCOUNTER — HOSPITAL ENCOUNTER (OUTPATIENT)
Dept: HOSPITAL 47 - RADCTMAIN | Age: 42
Discharge: HOME | End: 2022-09-12
Attending: INTERNAL MEDICINE
Payer: COMMERCIAL

## 2022-09-12 DIAGNOSIS — J43.9: Primary | ICD-10-CM

## 2022-09-12 PROCEDURE — 71250 CT THORAX DX C-: CPT

## 2022-09-12 NOTE — CT
EXAMINATION TYPE: CT chest wo con

CT DLP: 648.70 mGycm, Automated exposure control for dose reduction was used.

 

DATE OF EXAM: 9/12/2022 7:46 AM

 

COMPARISON: Chest radiograph from 7/12/2022

 

CLINICAL INDICATION:Female, 42 years old with history of R06.09 DYSPNE, SOB

 

TECHNIQUE: Multiple axial images were obtained through the chest. Sagittal and coronal reformats were
 created for review.

Contrast used: none.

Oral contrast used: none.

 

FINDINGS: 

LUNGS/ PLEURA: There is no evidence of interstitial thickening, significant groundglass opacity, anahi
ycombing or architectural distortion in the lungs. No bronchiectasis. No acute area of infiltrative o
r consolidative change. There is mild paraseptal emphysema greatest in the upper lung zones.

AIRWAY: Patent and unremarkable.

HEART: Size within normal limits.

MEDIASTINUM: No gross evidence of adenopathy.

VASCULATURE:  No aortic aneurysm. 

MUSCULOSKELETAL: No acute osseous abnormalities

SOFT TISSUES/LYMPH NODES: Unremarkable.

LOWER NECK: No significant findings.

UPPER ABDOMEN: No significant findings. 

 

IMPRESSION: Mild paraseptal emphysema changes, no evidence for interstitial lung disease or evidence 
for acute process.

## 2022-12-30 ENCOUNTER — HOSPITAL ENCOUNTER (OUTPATIENT)
Dept: HOSPITAL 47 - LABWHC1 | Age: 42
Discharge: HOME | End: 2022-12-30
Attending: FAMILY MEDICINE
Payer: COMMERCIAL

## 2022-12-30 DIAGNOSIS — Z00.00: Primary | ICD-10-CM

## 2022-12-30 LAB
CHOLEST SERPL-MCNC: 144 MG/DL (ref 0–200)
GLUCOSE SERPL-MCNC: 87 MG/DL (ref 70–110)
HDLC SERPL-MCNC: 28.4 MG/DL (ref 40–60)
LDLC SERPL CALC-MCNC: 93.4 MG/DL (ref 0–131)
TRIGL SERPL-MCNC: 111 MG/DL (ref 0–149)
VLDLC SERPL CALC-MCNC: 22.2 MG/DL (ref 5–40)

## 2022-12-30 PROCEDURE — 82947 ASSAY GLUCOSE BLOOD QUANT: CPT

## 2022-12-30 PROCEDURE — 80061 LIPID PANEL: CPT

## 2022-12-30 PROCEDURE — 36415 COLL VENOUS BLD VENIPUNCTURE: CPT
